# Patient Record
Sex: MALE | Race: WHITE | NOT HISPANIC OR LATINO | Employment: FULL TIME | URBAN - METROPOLITAN AREA
[De-identification: names, ages, dates, MRNs, and addresses within clinical notes are randomized per-mention and may not be internally consistent; named-entity substitution may affect disease eponyms.]

---

## 2017-06-08 ENCOUNTER — OFFICE VISIT (OUTPATIENT)
Dept: INTERNAL MEDICINE | Facility: CLINIC | Age: 22
End: 2017-06-08
Payer: COMMERCIAL

## 2017-06-08 VITALS
HEIGHT: 67 IN | HEART RATE: 84 BPM | DIASTOLIC BLOOD PRESSURE: 70 MMHG | SYSTOLIC BLOOD PRESSURE: 128 MMHG | TEMPERATURE: 98 F | WEIGHT: 211.63 LBS | RESPIRATION RATE: 16 BRPM | BODY MASS INDEX: 33.21 KG/M2

## 2017-06-08 DIAGNOSIS — M25.512 ACUTE PAIN OF LEFT SHOULDER: ICD-10-CM
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
## 2017-06-08 DIAGNOSIS — M54.2 MUSCULOSKELETAL NECK PAIN: ICD-10-CM

## 2017-06-08 PROCEDURE — 99213 OFFICE O/P EST LOW 20 MIN: CPT | Mod: S$GLB,,, | Performed by: INTERNAL MEDICINE

## 2017-06-08 PROCEDURE — 99999 PR PBB SHADOW E&M-EST. PATIENT-LVL IV: CPT | Mod: PBBFAC,,, | Performed by: INTERNAL MEDICINE

## 2017-06-08 RX ORDER — TRAMADOL HYDROCHLORIDE 50 MG/1
50 TABLET ORAL EVERY 6 HOURS PRN
Qty: 30 TABLET | Refills: 0 | Status: SHIPPED | OUTPATIENT
Start: 2017-06-08 | End: 2017-06-08 | Stop reason: SDUPTHER

## 2017-06-08 RX ORDER — CYCLOBENZAPRINE HCL 10 MG
10 TABLET ORAL 3 TIMES DAILY PRN
Qty: 30 TABLET | Refills: 0 | Status: SHIPPED | OUTPATIENT
Start: 2017-06-08 | End: 2017-06-18

## 2017-06-08 RX ORDER — METHOCARBAMOL 500 MG/1
1 TABLET, FILM COATED ORAL 3 TIMES DAILY PRN
Refills: 0 | COMMUNITY
Start: 2017-06-01 | End: 2017-06-08 | Stop reason: ALTCHOICE

## 2017-06-08 RX ORDER — LORATADINE 10 MG/1
10 TABLET ORAL DAILY
COMMUNITY

## 2017-06-08 RX ORDER — TRAMADOL HYDROCHLORIDE 50 MG/1
50 TABLET ORAL EVERY 8 HOURS PRN
Qty: 21 TABLET | Refills: 0 | Status: SHIPPED | OUTPATIENT
Start: 2017-06-08 | End: 2017-06-15
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
## 2017-08-10 ENCOUNTER — HOSPITAL ENCOUNTER (OUTPATIENT)
Dept: RADIOLOGY | Facility: HOSPITAL | Age: 22
Discharge: HOME | End: 2017-08-10
Attending: INTERNAL MEDICINE
Payer: COMMERCIAL

## 2017-08-10 ENCOUNTER — OFFICE VISIT (OUTPATIENT)
Dept: INTERNAL MEDICINE | Facility: CLINIC | Age: 22
End: 2017-08-10
Payer: COMMERCIAL

## 2017-08-10 VITALS
WEIGHT: 214.94 LBS | DIASTOLIC BLOOD PRESSURE: 82 MMHG | BODY MASS INDEX: 33.74 KG/M2 | TEMPERATURE: 99 F | HEIGHT: 67 IN | HEART RATE: 75 BPM | SYSTOLIC BLOOD PRESSURE: 123 MMHG

## 2017-08-10 DIAGNOSIS — G89.29 WRIST PAIN, CHRONIC, RIGHT: Primary | ICD-10-CM

## 2017-08-10 DIAGNOSIS — M25.512 CHRONIC LEFT SHOULDER PAIN: ICD-10-CM

## 2017-08-10 DIAGNOSIS — G89.29 WRIST PAIN, CHRONIC, RIGHT: ICD-10-CM

## 2017-08-10 DIAGNOSIS — M25.531 WRIST PAIN, CHRONIC, RIGHT: Primary | ICD-10-CM

## 2017-08-10 DIAGNOSIS — G89.29 CHRONIC LEFT SHOULDER PAIN: ICD-10-CM

## 2017-08-10 DIAGNOSIS — M25.531 WRIST PAIN, CHRONIC, RIGHT: ICD-10-CM

## 2017-08-10 PROCEDURE — 73110 X-RAY EXAM OF WRIST: CPT | Mod: 26,RT,, | Performed by: RADIOLOGY

## 2017-08-10 PROCEDURE — 3008F BODY MASS INDEX DOCD: CPT | Mod: S$GLB,,, | Performed by: INTERNAL MEDICINE

## 2017-08-10 PROCEDURE — 73110 X-RAY EXAM OF WRIST: CPT | Mod: TC,PO,RT

## 2017-08-10 PROCEDURE — 99213 OFFICE O/P EST LOW 20 MIN: CPT | Mod: S$GLB,,, | Performed by: INTERNAL MEDICINE

## 2017-08-10 PROCEDURE — 99999 PR PBB SHADOW E&M-EST. PATIENT-LVL IV: CPT | Mod: PBBFAC,,, | Performed by: INTERNAL MEDICINE

## 2017-08-10 RX ORDER — TRAMADOL HYDROCHLORIDE 50 MG/1
50 TABLET ORAL EVERY 8 HOURS PRN
Qty: 21 TABLET | Refills: 0 | Status: SHIPPED | OUTPATIENT
Start: 2017-08-10 | End: 2017-08-20
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
## 2019-02-17 ENCOUNTER — OFFICE VISIT (OUTPATIENT)
Dept: URGENT CARE | Facility: CLINIC | Age: 24
End: 2019-02-17
Payer: COMMERCIAL

## 2019-02-17 VITALS
BODY MASS INDEX: 33.59 KG/M2 | TEMPERATURE: 99 F | RESPIRATION RATE: 18 BRPM | HEIGHT: 67 IN | HEART RATE: 70 BPM | WEIGHT: 214 LBS | OXYGEN SATURATION: 98 % | DIASTOLIC BLOOD PRESSURE: 80 MMHG | SYSTOLIC BLOOD PRESSURE: 128 MMHG

## 2019-02-17 DIAGNOSIS — T78.40XA ALLERGIC REACTION, INITIAL ENCOUNTER: Primary | ICD-10-CM

## 2019-02-17 PROCEDURE — 3008F BODY MASS INDEX DOCD: CPT | Mod: CPTII,S$GLB,, | Performed by: FAMILY MEDICINE

## 2019-02-17 PROCEDURE — 96372 PR INJECTION,THERAP/PROPH/DIAG2ST, IM OR SUBCUT: ICD-10-PCS | Mod: S$GLB,,, | Performed by: FAMILY MEDICINE

## 2019-02-17 PROCEDURE — 99213 PR OFFICE/OUTPT VISIT, EST, LEVL III, 20-29 MIN: ICD-10-PCS | Mod: 25,S$GLB,, | Performed by: FAMILY MEDICINE

## 2019-02-17 PROCEDURE — 99213 OFFICE O/P EST LOW 20 MIN: CPT | Mod: 25,S$GLB,, | Performed by: FAMILY MEDICINE

## 2019-02-17 PROCEDURE — 3008F PR BODY MASS INDEX (BMI) DOCUMENTED: ICD-10-PCS | Mod: CPTII,S$GLB,, | Performed by: FAMILY MEDICINE

## 2019-02-17 PROCEDURE — 96372 THER/PROPH/DIAG INJ SC/IM: CPT | Mod: S$GLB,,, | Performed by: FAMILY MEDICINE

## 2019-02-17 RX ORDER — DIPHENHYDRAMINE HCL 25 MG
25 TABLET ORAL
Status: COMPLETED | OUTPATIENT
Start: 2019-02-17 | End: 2019-02-17

## 2019-02-17 RX ORDER — BETAMETHASONE SODIUM PHOSPHATE AND BETAMETHASONE ACETATE 3; 3 MG/ML; MG/ML
9 INJECTION, SUSPENSION INTRA-ARTICULAR; INTRALESIONAL; INTRAMUSCULAR; SOFT TISSUE
Status: COMPLETED | OUTPATIENT
Start: 2019-02-17 | End: 2019-02-17

## 2019-02-17 RX ADMIN — BETAMETHASONE SODIUM PHOSPHATE AND BETAMETHASONE ACETATE 9 MG: 3; 3 INJECTION, SUSPENSION INTRA-ARTICULAR; INTRALESIONAL; INTRAMUSCULAR; SOFT TISSUE at 06:02

## 2019-02-17 RX ADMIN — Medication 25 MG: at 06:02

## 2019-02-17 NOTE — PROGRESS NOTES
"Subjective:       Patient ID: Tiffany Tamez is a 23 y.o. male.    Vitals:  height is 5' 7" (1.702 m) and weight is 97.1 kg (214 lb). His temperature is 98.6 °F (37 °C). His blood pressure is 128/80 and his pulse is 70. His respiration is 18 and oxygen saturation is 98%.     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 Allergic to mold , exposed last night, feels slight tongue swelling and facial puffyness, denies SOB or itching, last exposure more then 3 years ago.did not take benadryl      Allergic Reaction   This is a new problem. The current episode started today. The problem has been gradually worsening since onset. Associated with: Mold. The exposure occurred at home. Pertinent negatives include no chest pain, coughing, diarrhea, rash or vomiting. Treatments tried: Alaclear. The treatment provided no relief. There is no history of seasonal allergies. Swelling is present on the face (Neck).       Constitution: Negative for chills, fatigue and fever.   HENT: Negative for congestion and sore throat.    Neck: Negative for painful lymph nodes.   Cardiovascular: Negative for chest pain and leg swelling.   Eyes: Negative for double vision and blurred vision.   Respiratory: Negative for cough and shortness of breath.    Gastrointestinal: Negative for nausea, vomiting and diarrhea.   Genitourinary: Negative for dysuria, frequency and urgency.   Musculoskeletal: Negative for joint pain, joint swelling, muscle cramps and muscle ache.   Skin: Negative for color change, pale and rash.   Allergic/Immunologic: Negative for seasonal allergies.   Neurological: Negative for dizziness, history of vertigo, light-headedness, passing out and headaches.   Hematologic/Lymphatic: Negative for swollen lymph nodes, easy bruising/bleeding and history of blood clots. Does not bruise/bleed easily.   Psychiatric/Behavioral: Negative for nervous/anxious, sleep " disturbance and depression. The patient is not nervous/anxious.        Objective:      Physical Exam   Constitutional: He is oriented to person, place, and time. He appears well-developed and well-nourished. He is cooperative.  Non-toxic appearance. He does not appear ill. No distress.   HENT:   Head: Normocephalic and atraumatic.   Right Ear: Hearing, tympanic membrane, external ear and ear canal normal.   Left Ear: Hearing, tympanic membrane, external ear and ear canal normal.   Nose: Nose normal. No mucosal edema, rhinorrhea or nasal deformity. No epistaxis. Right sinus exhibits no maxillary sinus tenderness and no frontal sinus tenderness. Left sinus exhibits no maxillary sinus tenderness and no frontal sinus tenderness.   Mouth/Throat: Uvula is midline, oropharynx is clear and moist and mucous membranes are normal. No trismus in the jaw. Normal dentition. No uvula swelling. No oropharyngeal exudate or posterior oropharyngeal erythema.   Eyes: Conjunctivae and lids are normal. Right eye exhibits no discharge. Left eye exhibits no discharge. No scleral icterus.   Sclera clear bilat   Neck: Trachea normal, normal range of motion, full passive range of motion without pain and phonation normal. Neck supple. No JVD present. No tracheal deviation present.   Cardiovascular: Normal rate, regular rhythm, normal heart sounds, intact distal pulses and normal pulses. Exam reveals no gallop and no friction rub.   No murmur heard.  Pulmonary/Chest: Effort normal and breath sounds normal. No stridor. No respiratory distress. He has no wheezes. He has no rales. He exhibits no tenderness.   Abdominal: Soft. Normal appearance and bowel sounds are normal. He exhibits no distension, no pulsatile midline mass and no mass. There is no tenderness.   Musculoskeletal: Normal range of motion. He exhibits no edema or deformity.   Lymphadenopathy:     He has no cervical adenopathy.   Neurological: He is alert and oriented to person, place,  and time. He exhibits normal muscle tone. Coordination normal.   Skin: Skin is warm, dry and intact. He is not diaphoretic. No pallor.   Erythema and puffyness of face    Psychiatric: He has a normal mood and affect. His speech is normal and behavior is normal. Judgment and thought content normal. Cognition and memory are normal.   Nursing note and vitals reviewed.      Assessment:       1. Allergic reaction, initial encounter        Plan:         Allergic reaction, initial encounter    Other orders  -     betamethasone acetate-betamethasone sodium phosphate injection 9 mg     Pt advised  To take benadryl alternate with claritin   FU with Allergist

## 2019-02-18 NOTE — PATIENT INSTRUCTIONS
Angioedema  Angioedema ([id number]-SAMI-[id number]) is a sudden appearance of swollen patches (edema) on the skin or mucous membranes. It most often involves the face, lips, mouth, tongue, back of throat, or vocal cords. It may also occur in other places, such as the arms or legs. A rash may also appear during the first 4 days of this illness.  There are different types of angioedema. Your symptoms will depend on what type of angioedema you have. Swelling and redness may be the main symptoms. Like allergic reactions, angioedema may include:  · Rash, hives, redness, welts, blisters  · Itching, burning, stinging, pain  · Dry, flaky, cracking, or scaly skin  · Swelling of the face, lips, tongue, or other parts of the body  More severe symptoms may include:  · Trouble swallowing, or feeling like your throat is closing  · Trouble breathing or wheezing  · Hoarse voice or trouble speaking  · Nausea, vomiting, diarrhea, or stomach cramps  · Feeling faint or lightheaded, rapid heart rate, or low blood pressure  Angioedema can be triggered by exposure to certain substances. Medical conditions involving the immune systems and certain infections may cause it. In rare cases, angioedema can be hereditary. Sometimes the cause may be very clear. However, it is often hard to find a cause. The most common causes include:  · Foods, such as shrimp, shellfish, peanuts, milk products, gluten, and eggs; also colorings, flavorings, and additives  · Insect bites or stings, from bees, mosquitos, fleas, or ticks  · Medicines, such as ACE inhibitors, penicillin, sulfa drugs, amoxicillin, aspirin, and ibuprofen  · Latex, which may be in gloves, clothes, toys, balloons, and some kinds of tape. People who are allergic to latex may have problems with foods such as bananas, avocados, kiwi, papaya, or chestnuts.  · Stress  · Heat, cold, or sunlight  The most common cause of angioedema is a reaction to a class of medicines called ACE inhibitors. These  are used to treat high blood pressure. ACE inhibitors include captopril, enalapril, and lisinopril. Angiodema can happen even after you have been taking the medicine for some time. Tell your doctor if you have angioedema symptoms and are taking any of these medicines. Angioedema may recur. It is important to watch for the earliest signs of this condition (see the list below). Contact your healthcare provider right away if swelling involves the face, mouth, or throat.  Home care  Rest quietly today. Avoid vigorous physical activity.  Medicines: The healthcare provider may prescribe medicines for itching, swelling, or pain. Follow the healthcare providers instructions when taking these medicines.  · Oral diphenhydramine is an antihistamine available without a prescription. Unless a prescription antihistamine was given, diphenhydramine may be used to reduce widespread itching. It may make you sleepy, so be careful using it when going to school, working, or driving. (Note: Do not use diphenhydramine if you have glaucoma or if you are a man who has trouble urinating due to an enlarged prostate.) Loratadine is an antihistamine that may cause less drowsiness.  · Do not use diphenhydramine cream on your skin. Some people can have an allergic reaction to this.  · Calamine lotion or oatmeal baths sometimes help with itching.  · You may use acetaminophen or ibuprofen for pain, unless another pain medicine was prescribed.  · If you were told that your angioedema was caused by a medicine you are taking, you must stop taking it. Ask your healthcare provider for a different one. In the future, advise medical staff that you are allergic to this medicine.  · If medicine was prescribed, such as steroids or antihistamines, be sure you understand what the medicine is and how to take it.   General care  · Make sure you do not scratch areas of the body that had a reaction. This will help prevent infection.   · Stay away from air  pollution, tobacco, and wood smoke. Also stay away from cold temperatures. These things can make allergy symptoms worse.  · Try to find out what cause your reaction. Make sure to remove the allergen. Future reactions may be worse.   · If you have a serious allergy, wear a medical alert bracelet that notes this allergy.  · If the healthcare provider prescribed an epinephrine auto injector kit, keep it with you at all times.   · Tell all care providers about your allergy. Ask them h ow to use any prescribed medicines.  · Keep a record of allergies and symptoms, and when they occurred. This will help your provider treat you over time.   Follow-up care  Follow up with your healthcare provider, or as advised. You may need to see an allergist. An allergist can help find the cause of an allergic reaction and give recommendations on how to prevent future reactions.  Call 911  Contact emergency services right away if any of these occur:  · Trouble breathing or swallowing, or wheezing  · Hoarse voice or trouble speaking, or drooling  · Chest pain or tightness  · Confusion, lightheadedness, or dizziness  · Extreme drowsiness or trouble awakening  · Fainting or loss of consciousness  · Rapid heart rate  · Vomiting blood, or large amounts of blood in stool  · Seizure  · Nausea, vomiting, diarrhea, abdominal pain, or stomach cramps  When to seek medical attention  Call your healthcare provider right away if any of the following occur:  · Symptoms don't go away  · Symptoms come back  · Symptoms get worse or new symptoms develop  · Hives feel uncomfortable  · Fever of 100.4°F (38°C), or as directed by your healthcare professional  Date Last Reviewed: 5/1/2017  © 5211-7322 [employer]. [address] [place] [address], [place] [address]. All rights reserved. This information is not intended as a substitute for professional medical care. Always follow your healthcare professional's instructions.

## 2019-02-19 ENCOUNTER — OFFICE VISIT (OUTPATIENT)
Dept: INTERNAL MEDICINE | Facility: CLINIC | Age: 24
End: 2019-02-19
Payer: COMMERCIAL

## 2019-02-19 VITALS
HEIGHT: 67 IN | HEART RATE: 68 BPM | TEMPERATURE: 98 F | WEIGHT: 226.44 LBS | RESPIRATION RATE: 16 BRPM | DIASTOLIC BLOOD PRESSURE: 78 MMHG | BODY MASS INDEX: 35.54 KG/M2 | SYSTOLIC BLOOD PRESSURE: 124 MMHG | OXYGEN SATURATION: 97 %

## 2019-02-19 DIAGNOSIS — T65.91XD ALLERGIC REACTION TO CHEMICAL SUBSTANCE, ACCIDENTAL OR UNINTENTIONAL, SUBSEQUENT ENCOUNTER: Primary | ICD-10-CM

## 2019-02-19 DIAGNOSIS — H10.213: ICD-10-CM

## 2019-02-19 DIAGNOSIS — Z77.120 MOLD EXPOSURE: ICD-10-CM

## 2019-02-19 DIAGNOSIS — R21 GENERALIZED RASH: ICD-10-CM

## 2019-02-19 PROCEDURE — 99999 PR PBB SHADOW E&M-EST. PATIENT-LVL V: ICD-10-PCS | Mod: PBBFAC,,, | Performed by: INTERNAL MEDICINE

## 2019-02-19 PROCEDURE — 3008F BODY MASS INDEX DOCD: CPT | Mod: CPTII,S$GLB,, | Performed by: INTERNAL MEDICINE

## 2019-02-19 PROCEDURE — 99214 PR OFFICE/OUTPT VISIT, EST, LEVL IV, 30-39 MIN: ICD-10-PCS | Mod: 25,S$GLB,, | Performed by: INTERNAL MEDICINE

## 2019-02-19 PROCEDURE — 99214 OFFICE O/P EST MOD 30 MIN: CPT | Mod: 25,S$GLB,, | Performed by: INTERNAL MEDICINE

## 2019-02-19 PROCEDURE — 96372 PR INJECTION,THERAP/PROPH/DIAG2ST, IM OR SUBCUT: ICD-10-PCS | Mod: S$GLB,,, | Performed by: INTERNAL MEDICINE

## 2019-02-19 PROCEDURE — 3008F PR BODY MASS INDEX (BMI) DOCUMENTED: ICD-10-PCS | Mod: CPTII,S$GLB,, | Performed by: INTERNAL MEDICINE

## 2019-02-19 PROCEDURE — 99999 PR PBB SHADOW E&M-EST. PATIENT-LVL V: CPT | Mod: PBBFAC,,, | Performed by: INTERNAL MEDICINE

## 2019-02-19 PROCEDURE — 96372 THER/PROPH/DIAG INJ SC/IM: CPT | Mod: S$GLB,,, | Performed by: INTERNAL MEDICINE

## 2019-02-19 RX ORDER — HYDROXYZINE HYDROCHLORIDE 50 MG/1
50 TABLET, FILM COATED ORAL 3 TIMES DAILY PRN
Qty: 30 TABLET | Refills: 0 | Status: SHIPPED | OUTPATIENT
Start: 2019-02-19 | End: 2019-03-14

## 2019-02-19 RX ORDER — PREDNISONE 20 MG/1
40 TABLET ORAL DAILY PRN
Qty: 12 TABLET | Refills: 0 | Status: SHIPPED | OUTPATIENT
Start: 2019-02-19 | End: 2019-03-01

## 2019-02-19 RX ORDER — OLOPATADINE HYDROCHLORIDE 1 MG/ML
1 SOLUTION/ DROPS OPHTHALMIC 2 TIMES DAILY
Qty: 5 ML | Refills: 0 | Status: SHIPPED | OUTPATIENT
Start: 2019-02-19 | End: 2019-04-02

## 2019-02-19 RX ORDER — TRIAMCINOLONE ACETONIDE 40 MG/ML
40 INJECTION, SUSPENSION INTRA-ARTICULAR; INTRAMUSCULAR
Status: COMPLETED | OUTPATIENT
Start: 2019-02-19 | End: 2019-02-19

## 2019-02-19 RX ADMIN — TRIAMCINOLONE ACETONIDE 40 MG: 40 INJECTION, SUSPENSION INTRA-ARTICULAR; INTRAMUSCULAR at 10:02

## 2019-02-19 NOTE — PATIENT INSTRUCTIONS
Saline nasal spray every 2 hours as needed    Vaseline on nostrils and face    Avoid all scented detergents and bath products    Use ivory soap or unscented soap    No lotions     Benadryl cream on arms and groin only    Pepcid 10mg twice daily- over the counter

## 2019-02-19 NOTE — PROGRESS NOTES
Subjective:       Patient ID: Tiffany Tamez is a 23 y.o. male who presents for Rash (allergic reaction to mold- per pt; urgent care follow up ) and Conjunctivitis      Rash   This is a new problem. The current episode started in the past 7 days. The problem is unchanged. The rash is diffuse. The rash is characterized by redness, swelling and itchiness. He was exposed to chemicals. Associated symptoms include eye pain and facial edema. Pertinent negatives include no congestion, cough, diarrhea, fever, rhinorrhea, shortness of breath or vomiting. Past treatments include oral steroids and anti-itch cream. The treatment provided no relief. His past medical history is significant for allergies. There is no history of asthma.   Conjunctivitis   This is a new problem. The current episode started in the past 7 days. The problem occurs constantly. The problem has been unchanged. Associated symptoms include a rash. Pertinent negatives include no abdominal pain, arthralgias, chest pain, chills, congestion, coughing, diaphoresis, fever, headaches, joint swelling, myalgias, nausea, swollen glands, vomiting or weakness. Nothing aggravates the symptoms. He has tried nothing for the symptoms.   Allergic Reaction   This is a new problem. The current episode started 2 days ago. The problem is unchanged. The problem is severe. Associated with: chemicals for mold removal. The exposure occurred at work. Associated symptoms include eye itching, eye redness and a rash. Pertinent negatives include no abdominal pain, chest pain, coughing, diarrhea, difficulty breathing, hyperventilation, skin blistering, stridor, trouble swallowing or vomiting. His rash is characterized by: diffuse and raised (no mucosal involvement).Past treatments include oral corticosteroid and diphenhydramine. The treatment provided no relief. There is no history of asthma or food allergies. Swelling is present on the eyes.      Urgent care for allergic reaction  on 2/17/19, treated with steroids      Review of Systems   Constitutional: Negative for chills, diaphoresis and fever.   HENT: Negative for congestion, ear pain, postnasal drip, rhinorrhea and trouble swallowing.    Eyes: Positive for pain, redness and itching.   Respiratory: Negative for cough, chest tightness, shortness of breath and stridor.    Cardiovascular: Negative for chest pain and palpitations.   Gastrointestinal: Negative for abdominal pain, constipation, diarrhea, nausea and vomiting.   Musculoskeletal: Negative for arthralgias, joint swelling and myalgias.   Skin: Positive for color change and rash. Negative for wound.   Allergic/Immunologic: Positive for environmental allergies. Negative for food allergies.   Neurological: Negative for dizziness, weakness and headaches.       Objective:      Physical Exam   Constitutional: He is oriented to person, place, and time. He appears well-developed and well-nourished.   HENT:   Head: Normocephalic and atraumatic.   Right Ear: Hearing and external ear normal.   Left Ear: Hearing and external ear normal.   Nose: Nose normal. No mucosal edema. No epistaxis.   Mouth/Throat: Uvula is midline, oropharynx is clear and moist and mucous membranes are normal.   Eyes: Lids are normal. Right conjunctiva is injected. Left conjunctiva is injected.   Neck: Full passive range of motion without pain.   Cardiovascular: Normal rate and regular rhythm.   No murmur heard.  Pulmonary/Chest: Effort normal and breath sounds normal. No tachypnea and no bradypnea. He has no wheezes.   Abdominal: Soft. Bowel sounds are normal. He exhibits no distension. There is no tenderness.   Musculoskeletal: Normal range of motion.   Neurological: He is alert and oriented to person, place, and time.   Skin: Skin is warm and dry. Rash noted. Rash is maculopapular (diffuse).   Psychiatric: He has a normal mood and affect.       Assessment/Plan:       1. Allergic reaction to chemical substance,  accidental or unintentional, subsequent encounter  - triamcinolone acetonide injection 40 mg  - predniSONE (DELTASONE) 20 MG tablet; Take 2 tablets (40 mg total) by mouth daily as needed (40mg daily x 3 days then 20mg daily x 3 days then 10mg daily x 3 days then stop).  Dispense: 12 tablet; Refill: 0  - hydrOXYzine (ATARAX) 50 MG tablet; Take 1 tablet (50 mg total) by mouth 3 (three) times daily as needed for Itching (instead of benadryl).  Dispense: 30 tablet; Refill: 0  - Ambulatory consult to Allergy    2. Mold exposure  - Ambulatory consult to Allergy    3. Conjunctivitis, chemical, bilateral  - olopatadine (PATANOL) 0.1 % ophthalmic solution; Place 1 drop into both eyes 2 (two) times daily.  Dispense: 5 mL; Refill: 0    4. Generalized rash  - triamcinolone acetonide injection 40 mg  - predniSONE (DELTASONE) 20 MG tablet; Take 2 tablets (40 mg total) by mouth daily as needed (40mg daily x 3 days then 20mg daily x 3 days then 10mg daily x 3 days then stop).  Dispense: 12 tablet; Refill: 0  - hydrOXYzine (ATARAX) 50 MG tablet; Take 1 tablet (50 mg total) by mouth 3 (three) times daily as needed for Itching (instead of benadryl).  Dispense: 30 tablet; Refill: 0    Call if no improvement in symptoms    Karina Diana MD

## 2019-02-28 ENCOUNTER — OFFICE VISIT (OUTPATIENT)
Dept: ALLERGY | Facility: CLINIC | Age: 24
End: 2019-02-28
Payer: COMMERCIAL

## 2019-02-28 VITALS — BODY MASS INDEX: 36.36 KG/M2 | HEIGHT: 67 IN | WEIGHT: 231.69 LBS

## 2019-02-28 DIAGNOSIS — H10.13 ALLERGIC CONJUNCTIVITIS, BILATERAL: ICD-10-CM

## 2019-02-28 DIAGNOSIS — L30.9 DERMATITIS: Primary | ICD-10-CM

## 2019-02-28 DIAGNOSIS — T78.3XXA ANGIOEDEMA, INITIAL ENCOUNTER: ICD-10-CM

## 2019-02-28 DIAGNOSIS — J30.9 CHRONIC ALLERGIC RHINITIS: ICD-10-CM

## 2019-02-28 PROCEDURE — 99244 PR OFFICE CONSULTATION,LEVEL IV: ICD-10-PCS | Mod: S$GLB,,, | Performed by: ALLERGY & IMMUNOLOGY

## 2019-02-28 PROCEDURE — 99999 PR PBB SHADOW E&M-EST. PATIENT-LVL II: ICD-10-PCS | Mod: PBBFAC,,, | Performed by: ALLERGY & IMMUNOLOGY

## 2019-02-28 PROCEDURE — 99999 PR PBB SHADOW E&M-EST. PATIENT-LVL II: CPT | Mod: PBBFAC,,, | Performed by: ALLERGY & IMMUNOLOGY

## 2019-02-28 PROCEDURE — 99244 OFF/OP CNSLTJ NEW/EST MOD 40: CPT | Mod: S$GLB,,, | Performed by: ALLERGY & IMMUNOLOGY

## 2019-02-28 NOTE — LETTER
February 28, 2019      Karina Diana MD  [address]  [place] [address]           [place] [facility]  [address] [facility]  [place] [address]  Phone: [phone]          Patient: Tiffany Tamez   MR Number: [id number]   YOB: 1995   Date of Visit: 2/28/2019       Dear Dr. Karina Diana:    Thank you for referring Tiffany Tamez to me for evaluation. Attached you will find relevant portions of my assessment and plan of care.    If you have questions, please do not hesitate to call me. I look forward to following Tiffany Tamez along with you.    Sincerely,    Cheryl Martinez MD    Enclosure  CC:  No Recipients    If you would like to receive this communication electronically, please contact [email or web address] or [phone] to request more information on [employer] Link access.    For providers and/or their staff who would like to refer a patient to [facility], please contact us through our one-stop-shop provider referral line, [facility], at [phone].    If you feel you have received this communication in error or would no longer like to receive these types of communications, please e-mail [email or web address]

## 2019-02-28 NOTE — PROGRESS NOTES
Subjective:       Patient ID: Tiffany Tamez is a 23 y.o. male.    Chief Complaint:  Allergic Reaction (mold exposer)      24 yo man presents for consult from Dr Ria Diana for allergic reaction. He states he has allergic rhinitis and conjunctivitis. About 3 years ago had allergy test positive to most trees and grass. He is worse in spring and summer and takes Claritin BID those seasons and daily in fall, will hold in winter. Normally this is for sneeze, runny nose, itchy red watery eyes and loratadine works pretty well. He was off of it loratadine 10 days ago when he was cleaning a home with green mold. He used mold control . Shortly after his eyes were red, watery and puffy. Face and neck were red with rash and swollen. He had slight tightness inc chest but could breathe. this was on Saturday. Sunday not better so went to  and given steroid shot. Monday not better so Tuesday saw PCP and given another steroid shot, atarax and loratadine. This helped. Rash is better but still with some areas peeling. He wants to know what more to do. He has no known food allergy except tomatoes cause GO upset. No insect or latex allergy. Had exercise induced asthma as child but no issues in 10 years. No other medical issues. No surgeries.         Environmental History: see history section for home environment  Review of Systems   Constitutional: Negative for activity change, appetite change, chills, fatigue, fever and unexpected weight change.   HENT: Positive for facial swelling, rhinorrhea and sneezing. Negative for congestion, ear discharge, ear pain, hearing loss, mouth sores, nosebleeds, postnasal drip, sinus pressure, sore throat, tinnitus, trouble swallowing and voice change.    Eyes: Positive for discharge, redness and itching. Negative for visual disturbance.   Respiratory: Positive for chest tightness. Negative for cough, shortness of breath and wheezing.    Cardiovascular: Negative for chest pain,  palpitations and leg swelling.   Gastrointestinal: Negative for abdominal distention, abdominal pain, constipation, diarrhea, nausea and vomiting.   Genitourinary: Negative for difficulty urinating.   Musculoskeletal: Negative for arthralgias, back pain, joint swelling and myalgias.   Skin: Positive for color change and rash. Negative for pallor.   Neurological: Negative for dizziness, tremors, speech difficulty, weakness, light-headedness and headaches.   Hematological: Negative for adenopathy. Does not bruise/bleed easily.   Psychiatric/Behavioral: Negative for agitation, confusion, decreased concentration and sleep disturbance. The patient is not nervous/anxious.         Objective:      Physical Exam   Constitutional: He is oriented to person, place, and time. He appears well-developed and well-nourished. No distress.   HENT:   Head: Normocephalic and atraumatic.   Right Ear: Hearing, tympanic membrane, external ear and ear canal normal.   Left Ear: Hearing, tympanic membrane, external ear and ear canal normal.   Nose: No mucosal edema (pink turbinates), rhinorrhea, sinus tenderness or septal deviation. No epistaxis.   Mouth/Throat: Oropharynx is clear and moist and mucous membranes are normal. No uvula swelling.   Eyes: Conjunctivae are normal. Right eye exhibits no discharge. Left eye exhibits no discharge.   Neck: Normal range of motion. No thyromegaly present.   Cardiovascular: Normal rate, regular rhythm and normal heart sounds.   No murmur heard.  Pulmonary/Chest: Effort normal and breath sounds normal. No respiratory distress. He has no wheezes.   Abdominal: Soft. He exhibits no distension. There is no tenderness.   Musculoskeletal: Normal range of motion. He exhibits no edema.   Lymphadenopathy:     He has no cervical adenopathy.   Neurological: He is alert and oriented to person, place, and time. Coordination normal.   Skin: Skin is warm and dry. No rash noted. No erythema.   Psychiatric: He has a normal  mood and affect. His behavior is normal. Judgment and thought content normal.   Nursing note and vitals reviewed.      Laboratory:   none performed   Assessment:       1. Dermatitis    2. Angioedema, initial encounter    3. Chronic allergic rhinitis    4. Allergic conjunctivitis, bilateral         Plan:       1. Advised pt reaction was more likely from the chemical given the rash he had and length of symptoms. advised to avid that chemical and in future wear mask and cover skin when spraying anything like that. Given his H/o pollen allergy is possible also had mold allergy. histamine control and was negative due to loratadine use so he will consider and reschedule in future to test  2. For rhinitis can continue loratadine daily to BID or trial levocetirizine and add fluticasone 2 SEN daily  3. Dr Diana notified of completed consult via [employer]

## 2019-03-14 ENCOUNTER — OFFICE VISIT (OUTPATIENT)
Dept: INTERNAL MEDICINE | Facility: CLINIC | Age: 24
End: 2019-03-14
Payer: COMMERCIAL

## 2019-03-14 ENCOUNTER — HOSPITAL ENCOUNTER (OUTPATIENT)
Dept: RADIOLOGY | Facility: HOSPITAL | Age: 24
Discharge: HOME | End: 2019-03-14
Attending: INTERNAL MEDICINE
Payer: COMMERCIAL

## 2019-03-14 VITALS
SYSTOLIC BLOOD PRESSURE: 96 MMHG | TEMPERATURE: 99 F | HEIGHT: 67 IN | DIASTOLIC BLOOD PRESSURE: 68 MMHG | WEIGHT: 234.56 LBS | HEART RATE: 74 BPM | BODY MASS INDEX: 36.81 KG/M2

## 2019-03-14 DIAGNOSIS — M25.531 ACUTE PAIN OF RIGHT WRIST: ICD-10-CM

## 2019-03-14 DIAGNOSIS — S69.91XA INJURY, WRIST, RIGHT, INITIAL ENCOUNTER: ICD-10-CM

## 2019-03-14 DIAGNOSIS — M25.531 ACUTE PAIN OF RIGHT WRIST: Primary | ICD-10-CM

## 2019-03-14 PROCEDURE — 3008F PR BODY MASS INDEX (BMI) DOCUMENTED: ICD-10-PCS | Mod: CPTII,S$GLB,, | Performed by: INTERNAL MEDICINE

## 2019-03-14 PROCEDURE — 99213 PR OFFICE/OUTPT VISIT, EST, LEVL III, 20-29 MIN: ICD-10-PCS | Mod: S$GLB,,, | Performed by: INTERNAL MEDICINE

## 2019-03-14 PROCEDURE — 73110 X-RAY EXAM OF WRIST: CPT | Mod: TC,PO,RT

## 2019-03-14 PROCEDURE — 99999 PR PBB SHADOW E&M-EST. PATIENT-LVL III: ICD-10-PCS | Mod: PBBFAC,,, | Performed by: INTERNAL MEDICINE

## 2019-03-14 PROCEDURE — 3008F BODY MASS INDEX DOCD: CPT | Mod: CPTII,S$GLB,, | Performed by: INTERNAL MEDICINE

## 2019-03-14 PROCEDURE — 99213 OFFICE O/P EST LOW 20 MIN: CPT | Mod: S$GLB,,, | Performed by: INTERNAL MEDICINE

## 2019-03-14 PROCEDURE — 73110 XR WRIST COMPLETE 3 VIEWS RIGHT: ICD-10-PCS | Mod: 26,RT,, | Performed by: RADIOLOGY

## 2019-03-14 PROCEDURE — 99999 PR PBB SHADOW E&M-EST. PATIENT-LVL III: CPT | Mod: PBBFAC,,, | Performed by: INTERNAL MEDICINE

## 2019-03-14 PROCEDURE — 73110 X-RAY EXAM OF WRIST: CPT | Mod: 26,RT,, | Performed by: RADIOLOGY

## 2019-03-14 NOTE — PROGRESS NOTES
Subjective:       Patient ID: Tiffany Tamez is a 23 y.o. male who presents for Wrist Injury (right)      Wrist Injury    The incident occurred 5 to 7 days ago. 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 The pain is present in the right wrist. The quality of the pain is described as aching. The pain is at a severity of 8/10. The pain is severe. The pain has been intermittent since the incident. Associated symptoms include muscle weakness (reports weakness when holding some items). Pertinent negatives include no chest pain, numbness or tingling. The symptoms are aggravated by lifting. He has tried nothing for the symptoms.        Review of Systems   Constitutional: Negative for chills, diaphoresis and fever.   HENT: Negative for congestion, rhinorrhea and sinus pressure.    Eyes: Negative for redness and itching.        Eye symptoms are better with Patanol   Respiratory: Negative for cough and shortness of breath.    Cardiovascular: Negative for chest pain and palpitations.   Gastrointestinal: Negative for abdominal pain, diarrhea, nausea and vomiting.   Musculoskeletal: Positive for arthralgias (right wrist pain with certain movements, feels a vibrating sensation when moving his wrist). Negative for myalgias.   Skin: Negative for color change and rash.        Symptoms controlled with continued use of claritin   Allergic/Immunologic: Positive for environmental allergies.   Neurological: Negative for dizziness, tingling, numbness and headaches.       Objective:      Physical Exam   Constitutional: He is oriented to person, place, and time. He appears well-developed and well-nourished.   HENT:   Head: Normocephalic and atraumatic.   Right Ear: Hearing and external ear normal.   Left Ear: Hearing and external ear normal.   Nose: Nose normal.   Mouth/Throat: Uvula is midline.   Eyes: Lids are  normal.   Neck: Full passive range of motion without pain.   Cardiovascular: Normal rate and regular rhythm.   No murmur heard.  Pulmonary/Chest: Effort normal and breath sounds normal. He has no wheezes.   Abdominal: Soft. Bowel sounds are normal. He exhibits no distension. There is no tenderness.   Musculoskeletal: Normal range of motion.        Right wrist: He exhibits tenderness and crepitus. He exhibits normal range of motion and no laceration.   + tenderness along wrist, pos finkelstein   Neurological: He is alert and oriented to person, place, and time. No sensory deficit.   Skin: Skin is warm, dry and intact. No rash noted. Rash is not maculopapular.   Psychiatric: He has a normal mood and affect.       Assessment/Plan:       1. Acute pain of right wrist  - X-Ray Wrist Complete 3 views Right; Future  - possible tenosynovitis  - Ambulatory Referral to Orthopedics    2. Injury, wrist, right, initial encounter  - X-Ray Wrist Complete 3 views Right; Future    Karina Diana MD

## 2019-03-29 DIAGNOSIS — R52 PAIN: Primary | ICD-10-CM

## 2019-04-02 ENCOUNTER — OFFICE VISIT (OUTPATIENT)
Dept: ORTHOPEDICS | Facility: CLINIC | Age: 24
End: 2019-04-02
Payer: COMMERCIAL

## 2019-04-02 VITALS
HEART RATE: 66 BPM | BODY MASS INDEX: 36.81 KG/M2 | HEIGHT: 67 IN | DIASTOLIC BLOOD PRESSURE: 80 MMHG | SYSTOLIC BLOOD PRESSURE: 115 MMHG | WEIGHT: 234.56 LBS

## 2019-04-02 DIAGNOSIS — M77.8 WRIST TENDONITIS: Primary | ICD-10-CM

## 2019-04-02 PROCEDURE — 99203 PR OFFICE/OUTPT VISIT, NEW, LEVL III, 30-44 MIN: ICD-10-PCS | Mod: S$GLB,,, | Performed by: ORTHOPAEDIC SURGERY

## 2019-04-02 PROCEDURE — 3008F PR BODY MASS INDEX (BMI) DOCUMENTED: ICD-10-PCS | Mod: CPTII,S$GLB,, | Performed by: ORTHOPAEDIC SURGERY

## 2019-04-02 PROCEDURE — 3008F BODY MASS INDEX DOCD: CPT | Mod: CPTII,S$GLB,, | Performed by: ORTHOPAEDIC SURGERY

## 2019-04-02 PROCEDURE — 99999 PR PBB SHADOW E&M-EST. PATIENT-LVL III: ICD-10-PCS | Mod: PBBFAC,,, | Performed by: ORTHOPAEDIC SURGERY

## 2019-04-02 PROCEDURE — 99203 OFFICE O/P NEW LOW 30 MIN: CPT | Mod: S$GLB,,, | Performed by: ORTHOPAEDIC SURGERY

## 2019-04-02 PROCEDURE — 99999 PR PBB SHADOW E&M-EST. PATIENT-LVL III: CPT | Mod: PBBFAC,,, | Performed by: ORTHOPAEDIC SURGERY

## 2019-04-02 NOTE — LETTER
April 5, 2019      Karina Diana MD  [address]  [place] [address]           [facility] [address] [facility] [address]  [address], [address]  [facility] [address]  Phone: [phone]          Patient: Tiffany Tamez   MR Number: [id number]   YOB: 1995   Date of Visit: 4/2/2019       Dear Dr. Karina Diana:    Thank you for referring Tiffany Tamez to me for evaluation. Attached you will find relevant portions of my assessment and plan of care.    If you have questions, please do not hesitate to call me. I look forward to following Tiffany Tamez along with you.    Sincerely,    Ramonita Willard MD    Enclosure  CC:  No Recipients    If you would like to receive this communication electronically, please contact [email or web address] or [phone] to request more information on [employer] access.    For providers and/or their staff who would like to refer a patient to [facility], please contact us through our one-stop-shop provider referral line, [facility], at [phone].    If you feel you have received this communication in error or would no longer like to receive these types of communications, please e-mail [email or web address]

## 2019-04-02 NOTE — PROGRESS NOTES
CHIEF COMPLAINT: Right wrist pain.                                             HISTORY OF PRESENT ILLNESS:  The patient is a 23 y.o.  right hand dominant male who presents for evaluation of his right wrist.       History of Trauma: started after using a jackhammer 1.5 weeks ago  Occupation:   Pain Duration: 1.5 weeks  Pain Quality: achy  Pain Context:improving  Pain Timing: intermittent  Pain Location:dorsoradial distal forearm  Pain Severity: moderate  Aggrevating Factors: activities, weight lifting  Previous Treatments: rest  Associated Signs and Symptoms:none    Pain is affecting ADLs.       PAST MEDICAL HISTORY:   Past Medical History:   Diagnosis Date    Allergy     Angio-edema     Asthma     Headache(784.0)     Migraine      PAST SURGICAL HISTORY:   Past Surgical History:   Procedure Laterality Date    WRIST SURGERY       FAMILY HISTORY:   Family History   Problem Relation Age of Onset    No Known Problems Mother     Allergic rhinitis Father     Asthma Father     Asthma Sister     Allergic rhinitis Sister     Allergies Neg Hx     Angioedema Neg Hx     Atopy Neg Hx     Eczema Neg Hx     Immunodeficiency Neg Hx     Rhinitis Neg Hx     Urticaria Neg Hx      SOCIAL HISTORY:   Social History     Socioeconomic History    Marital status: Single     Spouse name: Not on file    Number of children: Not on file    Years of education: Not on file    Highest education level: Not on file   Occupational History    Not on file   Social Needs    Financial resource strain: Not on file    Food insecurity:     Worry: Not on file     Inability: Not on file    Transportation needs:     Medical: Not on file     Non-medical: Not on file   Tobacco Use    Smoking status: Never Smoker    Smokeless tobacco: Never Used   Substance and Sexual Activity    Alcohol use: Yes     Alcohol/week: 0.0 oz     Comment: 5 drinks per week    Drug use: Yes     Comment: marijuana    Sexual  "activity: Yes     Partners: Female     Birth control/protection: None   Lifestyle    Physical activity:     Days per week: Not on file     Minutes per session: Not on file    Stress: Not on file   Relationships    Social connections:     Talks on phone: Not on file     Gets together: Not on file     Attends Judaism service: Not on file     Active member of club or organization: Not on file     Attends meetings of clubs or organizations: Not on file     Relationship status: Not on file    Intimate partner violence:     Fear of current or ex partner: Not on file     Emotionally abused: Not on file     Physically abused: Not on file     Forced sexual activity: Not on file   Other Topics Concern    Not on file   Social History Narrative    Not on file       MEDICATIONS:   Current Outpatient Medications:     loratadine (CLARITIN) 10 mg tablet, Take 10 mg by mouth once daily at 6am., Disp: , Rfl:   ALLERGIES:   Review of patient's allergies indicates:   Allergen Reactions    Nsaids (non-steroidal anti-inflammatory drug) Anaphylaxis and Swelling    Mold Swelling       VITAL SIGNS: /80 (BP Location: Left arm, Patient Position: Sitting, BP Method: Large (Automatic))   Pulse 66   Ht 5' 7" (1.702 m)   Wt 106.4 kg (234 lb 9.1 oz)   BMI 36.74 kg/m²      Review of Systems   Constitution: Negative for chills, fever, weakness and weight loss.   HENT: Negative for congestion.   Cardiovascular: Negative for chest pain and dyspnea on exertion.   Respiratory: Negative for cough and shortness of breath.   Hematologic/Lymphatic: Does not bruise/bleed easily.   Skin: Negative for rash and suspicious lesions.   Musculoskeletal: see HPI  Gastrointestinal: Negative for bowel incontinence, constipation,diarrhea, vomiting.   Genitourinary: Negative for bladder incontinence.   Neurological: Negative for numbness, paresthesias and sensory change.       PHYSICAL EXAMINATION:  General:  The patient is alert and oriented x 3.  " Mood is pleasant.  Observation of ears, eyes and nose reveal no gross abnormalities.  No labored breathing observed.  Gait is coordinated. Patient can toe walk and heel walk without difficulty.       right Hand Exam    OBSERVATION:     Swelling  none  Deformity  none   Discoloration  none   Atrophy  none   Scars   none             Erythema                    none    TENDERNESS:   Radial:   DRUJ    Neg   Radial Styloid   Neg   Radial Shaft                            Neg   1st dorsal Compartment Neg   Alicia's Tubercle  Neg   Basal Joint Thumb  Neg   ECRL Tendon   Neg   FCR Tendon   Neg   Snuff Box   Neg   Lunate    Neg     Ulnar:   ECU Sheath   Neg   FCU Tendon   Neg   Pisiform   Neg   TFCC    Neg   Ulnar Styloid   Neg   Ulnar Shaft   Neg     Hand:   Palmar Fascia  Neg   Metacarpal   Neg   MCP    Neg   Phalanx   Neg   PIP    Neg   DIP    Neg   A1- Pulley   Neg   Flexor Tendons  Neg   Finger Tip   Neg         ROM: (AROM)   Right    Left            Wrist Flexion   80°       80°      Wrist Extension   75°      75°   Radial Deviation  30°     30°    Ulnar Deviation  30°      30°      Pronation   90     90   Supination   90    90    STRENGTH:    RIGHT    LEFT    Wrist Flexion   5/5    5/5    Wrist Extension  5/5    5/5   Hand    5/5    5/5   Opposition   5/5    5/5    SPECIAL TESTS:   Phalens      Neg   Durkan Carpal Compression    Neg   Tinel (wrist)      Neg   Finkelstein      Neg   Piano Carrion (ulnar translation)    Neg   Hernandez Scaphoid Shift    Neg   Caden Test      Normal   Froment Sign      Neg   CMC Grind      Neg   Aydin (Intrinsic Tightness)    Neg         TTP over extensor tendons , no ROm deficiet. + fluid over tendons in 4th compartment           EXTREMITY NEURO-VASCULAR EXAM    Sensation grossly intact to light touch all dermatomal regions.    DTR 2+ Biceps, Triceps, BR and Negative Monets sign   Grossly intact motor function of AIN, PIN, Median, Ulnar , Radial nerves   Distal pulses radial and ulnar  2+, brisk cap refill, symmetric.    Compartments of forearm and hand are soft and compressible     NECK:  Painless FROM and spinous processes non-tender. Negative Spurlings sign.      OTHER FINDINGS:      XRAYS:  Hand series right,  were obtained and reviewed  No convincing fracture or dislocation is noted. The osseous structures appear well mineralized and well aligned    MRI: none    ASSESSMENT:   Tiffany Tamez is a 22 yo male with right wrist extensor tendonitis    PLAN:    I have discussed the nature of this problem with the patient today. We discussed both treatment options.   Bracing, OT  Offered injection, however, patient declined for now  Follow-up PRN

## 2019-04-02 NOTE — PROGRESS NOTES
I have personally taken the history and examined this patient. I agree with the resident's note as stated above.  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 Pt is normally a . Pain has gotten better    Exam: TTP over extensor tendons , no ROm deficiet. + fluid over tendons in 4th compartment  Xrays negative    Plan:OT, bracing

## 2019-04-11 ENCOUNTER — CLINICAL SUPPORT (OUTPATIENT)
Dept: REHABILITATION | Facility: HOSPITAL | Age: 24
End: 2019-04-11
Attending: ORTHOPAEDIC SURGERY
Payer: COMMERCIAL

## 2019-04-11 DIAGNOSIS — M25.431 EFFUSION, RIGHT WRIST: ICD-10-CM

## 2019-04-11 DIAGNOSIS — M25.531 PAIN IN RIGHT WRIST: ICD-10-CM

## 2019-04-11 PROCEDURE — 97110 THERAPEUTIC EXERCISES: CPT | Mod: PO

## 2019-04-11 PROCEDURE — 97165 OT EVAL LOW COMPLEX 30 MIN: CPT | Mod: PO

## 2019-04-11 NOTE — PLAN OF CARE
[facility] Occupational Therapy  Initial Evaluation     Date: 4/11/2019  Name: Tiffany Tamez  Clinic Number: [id number]    Therapy Diagnosis:   Encounter Diagnoses   Name Primary?    Pain in right wrist     Effusion, right wrist      Physician: Ramonita Willard, *    Physician Orders: Eval and treat, ROM, HEP, modalities ok prn, pain control, edema management   2 x weekly for 6 weeks  Medical Diagnosis: M77.8 (ICD-10-CM) - Wrist tendonitis  Surgical Procedure and Date: N/a  Evaluation Date: 4/11/2019  Insurance Authorization Period Expiration: 12/31/19  Plan of Care Certification Period: 5/24/19  Date of Return to MD: not scheduled    Visit # / Visits authorized: 1/ 20   Time In:8:05AM  Time Out: 9:00AM  Total Billable Time: 55 minutes  FOTO #:     Precautions:  Standard    Subjective     Involved Side: RUE  Dominant Side: Right  Date of Onset: Approximately 3.5 weeks ago  History of Current Condition/Mechanism of Injury: Pt states that pain began about a month ago after operating a wenceslao hammer. He states that it hurts when pushing up through his wrist, when his thumb catches, and when he pronates his forearm. He reports MD gave him a pre fabricated soft wrist brace to wear.  Imaging: no  Previous Therapy: no    Past Medical History/Physical Systems Review:   Tiffany Tamez  has a past medical history of Allergy, Angio-edema, Asthma, Headache(784.0), and Migraine.    Tiffany Tamez  has a past surgical history that includes Wrist surgery.    Tiffany has a current medication list which includes the following prescription(s): loratadine.    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 Upon removal, there is mild edema of the dorsal first 1/3 of forearm    Palpation: Tenderness to palpation about 2cm proximal to sergio's tubercle    AROM of hand and wrist is WNL.    /pinch deferred due to pain    Treatment     Treatment Time In: 8:40AM  Treatment Time Out: 8:55AM  Total Treatment time separate from Evaluation time:15 mins      Tiffany received therapeutic exercises for 15 minutes including:  -Initial HEP instruction and demonstration    Home Exercise Program/Education:  Issued HEP (see patient instructions in EMR) and educated on modality use for pain management . Exercises were reviewed and Tiffany was able to demonstrate them prior to the end of the session.   Pt received a written copy of exercises to perform at home. Tiffany demonstrated good  understanding of the education provided.  Pt was advised to perform these exercises free of pain, and to stop performing them if pain occurs.    Patient/Family Education: role of OT, goals for OT, scheduling/cancellations - pt verbalized understanding. Discussed insurance limitations with patient.    Additional Education provided: Recommended patient use soft thumb spica brace vs. Short arm brace. Discussed splint options with patient.    Assessment     Tiffany Tamez is a 23 y.o. male referred to outpatient occupational therapy with a medical diagnosis of R wrist tendonitis resulting in Decreased functional hand use, " Increased pain and Edema and demonstrates limitations as described in the chart below. Following medical record review it is determined that pt will benefit from occupational therapy services in order to maximize pain free and/or functional use of right UE. The following goals were discussed with the patient and patient is in agreement with them as to be addressed in the treatment plan. The patient's rehab potential is Excellent.     Anticipated barriers to occupational therapy:   Pt has no cultural, educational or language barriers to learning provided.    Profile and History Assessment of Occupational Performance Level of Clinical Decision Making Complexity Score   Occupational Profile:   Tiffany Tamez is a 23 y.o. 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 Tiffany Tamez has difficulty with  ADLs and IADLs as listed previously, which  affecting his/her daily functional abilities.      Comorbidities:    has a past medical history of Allergy, Angio-edema, Asthma, Headache(784.0), and Migraine.    Medical and Therapy History Review:   Brief Performance Deficits    Physical:  Edema  Pain    Cognitive:  No Deficits    Psychosocial:    No Deficits     Clinical Decision Making:  moderate    Assessment Process:  Problem-Focused Assessments    Modification/Need for Assistance:  Not Necessary    Intervention Selection:  Limited Treatment Options       low  Based on PMHX, co morbidities , data from assessments and functional level of assistance required with task and clinical presentation directly impacting function.       The following goals were discussed with the patient and patient is in agreement with them as to be addressed in the treatment plan.     Goals:   Short Term Goals: (in 1 weeks)  1) Patient will be independent in HEP and splint use  (to be completed within 3 weeks)  2) Decrease pain with wrist extension to no more than 4/10 to improve functional mobility  Long Term Goals:  (to be completed by discharge)  3) Pt will write on chalkboard at school for at least 5 minutes without report increased pain  4) Return to prior level of functioning with IADL and work activities.    Plan   Certification Period/Plan of care expiration: 4/11/2019 to 5/24/19.    Outpatient Occupational Therapy 2 times weekly for 6 weeks to include the following interventions: Fluidotherapy, Manual therapy/joint mobilizations, Modalities for pain management, US 3 mhz, Therapeutic exercises/activities., Iontophoresis with 2.0 cc Dexamethasone, Strengthening, Edema Control, Electrical Modalities and Ergonomics.      Hafsa Tapia, OT

## 2019-04-12 PROBLEM — M25.531 PAIN IN RIGHT WRIST: Status: ACTIVE | Noted: 2019-04-12

## 2019-04-12 PROBLEM — M25.431: Status: ACTIVE | Noted: 2019-04-12

## 2019-04-15 ENCOUNTER — CLINICAL SUPPORT (OUTPATIENT)
Dept: REHABILITATION | Facility: HOSPITAL | Age: 24
End: 2019-04-15
Attending: ORTHOPAEDIC SURGERY
Payer: COMMERCIAL

## 2019-04-15 DIAGNOSIS — M25.531 PAIN IN RIGHT WRIST: ICD-10-CM

## 2019-04-15 DIAGNOSIS — M25.431 EFFUSION, RIGHT WRIST: ICD-10-CM

## 2019-04-15 PROCEDURE — 97140 MANUAL THERAPY 1/> REGIONS: CPT | Mod: PO

## 2019-04-15 PROCEDURE — 97035 APP MDLTY 1+ULTRASOUND EA 15: CPT | Mod: PO

## 2019-04-15 NOTE — PROGRESS NOTES
"  Occupational Therapy Daily Treatment Note     Date: 4/15/2019  Name: Tiffany Tamez  Clinic Number: [id number]    Therapy Diagnosis:   Encounter Diagnoses   Name Primary?    Pain in right wrist     Effusion, right wrist      Physician: Ramonita Willard, *    Physician Orders: Eval and treat, ROM, HEP, modalities ok prn, pain control, edema management   2 x weekly for 6 weeks  Medical Diagnosis: M77.8 (ICD-10-CM) - Wrist tendonitis  Surgical Procedure and Date: N/a  Evaluation Date: 4/11/2019  Insurance Authorization Period Expiration: 12/31/19  Plan of Care Certification Period: 5/24/19  Date of Return to MD: not scheduled     Visit # / Visits authorized: 2/ 20   Time In:5:30PM  Time Out: 6:15PM  Total Billable Time: 45 minutes     Precautions:  Standard. Allergic to NSAIDs per pt    Subjective     Patient presents with online thumb spica brace option. He wants to make sure it is appropriate for his condition.     Functional Pain Scale Rating 0-10:   8/10 at worst  3/10 at rest:  Location: R dorsal wrist/forearm  Description: "lightning"    Objective     Tiffany received the following supervised modalities after being cleared for contradictions for 15 minutes:  -Patient received MH to increase blood flow, circulation and tissue elasticity prior to therex    Tiffany received the following direct contact modalities after being cleared for contraindications for 8 minutes:  -Pulsed wave ultrasound 20% 3.3MHz 1.0 w/cm^2 to dorsal radial forearm at EPB/APL insertion site to decrease inflammation    Tiffany received the following manual therapy techniques for 15 minutes:   -STM to to insertion of EPB and APL  -Passive tendon stretching- EPB, APL, ECRB, ECRL    Pt fitted with size D tubigrip compression sleeve    Home Exercises and Education Provided     Education provided: OT ok'd online thumb spica splint. Instructed him to wear it during provocative activities.    Written Home Exercises Provided: Yes  Exercises were " reviewed and Tiffany was able to demonstrate them prior to the end of the session.  Tiffany demonstrated good  understanding of the HEP provided.   .   See EMR under Patient Instructions for exercises provided during initial evaluation.     Assessment     Pt with obvious swelling of dorsal radial forearm near EPB/APL insertion site. Discussed avoiding provacative activities with repetitive or sustained wrist and thumb extension. Initiated pulsed wave ultrasound to calm inlflammation and STM to promote increased blood circulation and tissue healing.    Tiffany is progressing well towards his goals and there are no updates to goals at this time. Pt prognosis is Excellent.     Pt will continue to benefit from skilled outpatient occupational therapy to address the deficits listed in the problem list on initial evaluation provide pt/family education and to maximize pt's level of independence in the home and community environment.     Anticipated barriers to occupational therapy:     Pt's spiritual, cultural and educational needs considered and pt agreeable to plan of care and goals.    Goals:  Short Term Goals: (in 1 weeks)  1) Patient will be independent in HEP and splint use-Progressing  (to be completed within 3 weeks)  2) Decrease pain with wrist extension to no more than 4/10 to improve functional mobility-Not met  Long Term Goals: (to be completed by discharge)  3) Pt will write on chalkboard at school for at least 5 minutes without report increased pain-Not met  4) Return to prior level of functioning with IADL and work activities.-Not met         Plan   Continue Outpatient Occupational Therapy 2 times weekly for 6 weeks    Updates/Grading for next session: Ensure appropriate TSS wear      Hafsa Tapia OT

## 2019-04-18 ENCOUNTER — CLINICAL SUPPORT (OUTPATIENT)
Dept: REHABILITATION | Facility: HOSPITAL | Age: 24
End: 2019-04-18
Attending: ORTHOPAEDIC SURGERY
Payer: COMMERCIAL

## 2019-04-18 DIAGNOSIS — M25.431 EFFUSION, RIGHT WRIST: ICD-10-CM

## 2019-04-18 DIAGNOSIS — M25.531 PAIN IN RIGHT WRIST: ICD-10-CM

## 2019-04-18 PROCEDURE — 97035 APP MDLTY 1+ULTRASOUND EA 15: CPT | Mod: PO

## 2019-04-18 PROCEDURE — 97110 THERAPEUTIC EXERCISES: CPT | Mod: PO

## 2019-04-18 PROCEDURE — 97140 MANUAL THERAPY 1/> REGIONS: CPT | Mod: PO

## 2019-04-18 NOTE — PROGRESS NOTES
"  Occupational Therapy Daily Treatment Note     Date: 4/18/2019  Name: Tiffany Tamez  Clinic Number: [id number]    Therapy Diagnosis:   Encounter Diagnoses   Name Primary?    Pain in right wrist     Effusion, right wrist      Physician: Ramonita Willard, *    Physician Orders: Eval and treat, ROM, HEP, modalities ok prn, pain control, edema management   2 x weekly for 6 weeks  Medical Diagnosis: M77.8 (ICD-10-CM) - Wrist tendonitis  Surgical Procedure and Date: N/a  Evaluation Date: 4/11/2019  Insurance Authorization Period Expiration: 12/31/19  Plan of Care Certification Period: 5/24/19  Date of Return to MD: not scheduled     Visit # / Visits authorized: 3/ 20   Time In:9:35AM  Time Out: 10:30AM  Total Billable Time: 38 minutes     Precautions:  Standard. Allergic to NSAIDs per pt    Subjective     Pt reports TSS came in but they sent the wrong side of the body. The other side will come in in two days.    Functional Pain Scale Rating 0-10:   8/10 at worst  3/10 at rest:  Location: R dorsal wrist/forearm  Description: "lightning"    Objective     Tiffany received the following supervised modalities after being cleared for contradictions for 15 minutes:  -Patient received MH to increase blood flow, circulation and tissue elasticity prior to therex    Tiffany received the following direct contact modalities after being cleared for contraindications for 8 minutes:  -Pulsed wave ultrasound 20% 3.3MHz 1.0 w/cm^2 to dorsal radial forearm at EPB/APL insertion site to decrease inflammation    Tiffany received the following manual therapy techniques for 15 minutes:   -IASTM to radial aspect of dorsal forearm x 10'  -Passive tendon stretching- EPB, APL, ECRB, ECRL    -Tiffany received the following therapeutic exercises for 15 minutues:  -Thumb radial abd/add with palm down x 10 reps  -Wrist flex/ext with thumb in neutral x 10 reps  -Active posterior compartment stretches    Pt fitted with size D tubigrip compression " sleeve    Home Exercises and Education Provided     Education provided: OT ok'd online thumb spica splint. Instructed him to wear it during provocative activities.    Written Home Exercises Provided: Yes  Exercises were reviewed and Tiffany was able to demonstrate them prior to the end of the session.  Tiffany demonstrated good  understanding of the HEP provided.   .   See EMR under Patient Instructions for exercises provided during initial evaluation.     Assessment     Pt to initiate use of TSS once it arrives in mail. He is compliant with HEP.    Tiffany is progressing well towards his goals and there are no updates to goals at this time. Pt prognosis is Excellent.     Pt will continue to benefit from skilled outpatient occupational therapy to address the deficits listed in the problem list on initial evaluation provide pt/family education and to maximize pt's level of independence in the home and community environment.     Anticipated barriers to occupational therapy:     Pt's spiritual, cultural and educational needs considered and pt agreeable to plan of care and goals.    Goals:  Short Term Goals: (in 1 weeks)  1) Patient will be independent in HEP and splint use-Progressing  (to be completed within 3 weeks)  2) Decrease pain with wrist extension to no more than 4/10 to improve functional mobility-Not met  Long Term Goals: (to be completed by discharge)  3) Pt will write on chalkboard at school for at least 5 minutes without report increased pain-Not met  4) Return to prior level of functioning with IADL and work activities.-Not met         Plan   Continue Outpatient Occupational Therapy 2 times weekly for 6 weeks    Updates/Grading for next session: Ensure appropriate TSS wear      Hafsa Tapia OT

## 2019-04-22 ENCOUNTER — CLINICAL SUPPORT (OUTPATIENT)
Dept: REHABILITATION | Facility: HOSPITAL | Age: 24
End: 2019-04-22
Attending: ORTHOPAEDIC SURGERY
Payer: COMMERCIAL

## 2019-04-22 DIAGNOSIS — M25.531 PAIN IN RIGHT WRIST: ICD-10-CM

## 2019-04-22 DIAGNOSIS — M25.431 EFFUSION, RIGHT WRIST: ICD-10-CM

## 2019-04-22 PROCEDURE — 97140 MANUAL THERAPY 1/> REGIONS: CPT | Mod: PO

## 2019-04-22 PROCEDURE — 97035 APP MDLTY 1+ULTRASOUND EA 15: CPT | Mod: PO

## 2019-04-22 PROCEDURE — 97110 THERAPEUTIC EXERCISES: CPT | Mod: PO

## 2019-04-22 NOTE — PROGRESS NOTES
"  Occupational Therapy Daily Treatment Note     Date: 4/22/2019  Name: Tiffany Tamez  Clinic Number: [id number]    Therapy Diagnosis:   Encounter Diagnoses   Name Primary?    Pain in right wrist     Effusion, right wrist      Physician: Ramonita Willard, *    Physician Orders: Eval and treat, ROM, HEP, modalities ok prn, pain control, edema management   2 x weekly for 6 weeks  Medical Diagnosis: M77.8 (ICD-10-CM) - Wrist tendonitis  Surgical Procedure and Date: N/a  Evaluation Date: 4/11/2019  Insurance Authorization Period Expiration: 12/31/19  Plan of Care Certification Period: 5/24/19  Date of Return to MD: not scheduled     Visit # / Visits authorized: 4/ 20   Time In:10:35AM  Time Out: 11:15AM  Total Billable Time: 40 minutes     Precautions:  Standard. Allergic to NSAIDs per pt    Subjective     Pt reports TSS came in but they sent the wrong side of the body. The other side will come in in two days.    Functional Pain Scale Rating 0-10:   8/10 at worst  3/10 at rest:  Location: R dorsal wrist/forearm  Description: "lightning"    Objective     Tiffany received the following supervised modalities after being cleared for contradictions for 15 minutes:  -Patient received MH to increase blood flow, circulation and tissue elasticity prior to therex    Tiffany received the following direct contact modalities after being cleared for contraindications for 8 minutes:  -Pulsed wave ultrasound 20% 3.3MHz 1.0 w/cm^2 to dorsal radial forearm at EPB/APL insertion site to decrease inflammation    Tiffany received the following manual therapy techniques for 15 minutes:   -IASTM to radial aspect of dorsal forearm x 10'  -Passive tendon stretching- EPB, APL, ECRB, ECRL    -Tiffany received the following therapeutic exercises for 15 minutues:  -Thumb radial abd/add with palm down x 10 reps  -Wrist flex/ext with thumb in neutral x 10 reps  -Active posterior compartment stretches    Pt fitted with size D tubigrip compression " sleeve    Home Exercises and Education Provided     Education provided: OT ok'd online thumb spica splint. Instructed him to wear it during provocative activities.    Written Home Exercises Provided: Yes  Exercises were reviewed and Tiffany was able to demonstrate them prior to the end of the session.  Tiffany demonstrated good  understanding of the HEP provided.   .   See EMR under Patient Instructions for exercises provided during initial evaluation.     Assessment     Pt with decreased c/o pain following IASTM treatment.    Tiffany is progressing well towards his goals and there are no updates to goals at this time. Pt prognosis is Excellent.     Pt will continue to benefit from skilled outpatient occupational therapy to address the deficits listed in the problem list on initial evaluation provide pt/family education and to maximize pt's level of independence in the home and community environment.     Anticipated barriers to occupational therapy:     Pt's spiritual, cultural and educational needs considered and pt agreeable to plan of care and goals.    Goals:  Short Term Goals: (in 1 weeks)  1) Patient will be independent in HEP and splint use-Progressing  (to be completed within 3 weeks)  2) Decrease pain with wrist extension to no more than 4/10 to improve functional mobility-Not met  Long Term Goals: (to be completed by discharge)  3) Pt will write on chalkboard at school for at least 5 minutes without report increased pain-Not met  4) Return to prior level of functioning with IADL and work activities.-Not met         Plan   Continue Outpatient Occupational Therapy 2 times weekly for 6 weeks    Updates/Grading for next session: Ensure appropriate TSS wear      Hafsa Tapia OT

## 2019-04-25 ENCOUNTER — CLINICAL SUPPORT (OUTPATIENT)
Dept: REHABILITATION | Facility: HOSPITAL | Age: 24
End: 2019-04-25
Attending: ORTHOPAEDIC SURGERY
Payer: COMMERCIAL

## 2019-04-25 DIAGNOSIS — M25.531 PAIN IN RIGHT WRIST: ICD-10-CM

## 2019-04-25 DIAGNOSIS — M25.431 EFFUSION, RIGHT WRIST: ICD-10-CM

## 2019-04-25 PROCEDURE — 97035 APP MDLTY 1+ULTRASOUND EA 15: CPT | Mod: PO

## 2019-04-25 PROCEDURE — 97140 MANUAL THERAPY 1/> REGIONS: CPT | Mod: PO

## 2019-04-25 NOTE — PROGRESS NOTES
"  Occupational Therapy Daily Treatment Note     Date: 4/25/2019  Name: Tiffany Tamez  Clinic Number: [id number]    Therapy Diagnosis:   Encounter Diagnoses   Name Primary?    Pain in right wrist     Effusion, right wrist      Physician: Ramonita Willard, *    Physician Orders: Eval and treat, ROM, HEP, modalities ok prn, pain control, edema management   2 x weekly for 6 weeks  Medical Diagnosis: M77.8 (ICD-10-CM) - Wrist tendonitis  Surgical Procedure and Date: N/a  Evaluation Date: 4/11/2019  Insurance Authorization Period Expiration: 12/31/19  Plan of Care Certification Period: 5/24/19  Date of Return to MD: not scheduled     Visit # / Visits authorized: 5/ 20   Time In:9:30AM  Time Out: 10:10AM  Total Billable Time: 40 minutes     Precautions:  Standard. Allergic to NSAIDs per pt    Subjective     Pt presents with appropriate brace donned. He reports he is able to write on the chalkboard with it at work comfortably.    Functional Pain Scale Rating 0-10:   8/10 at worst  3/10 at rest:  Location: R dorsal wrist/forearm  Description: "lightning"    Objective     Tiffany received the following supervised modalities after being cleared for contradictions for 15 minutes:  -Patient received MH to increase blood flow, circulation and tissue elasticity prior to therex    Tiffany received the following direct contact modalities after being cleared for contraindications for 8 minutes:  -Pulsed wave ultrasound 20% 3.3MHz 1.0 w/cm^2 to dorsal radial forearm at EPB/APL insertion site to decrease inflammation    Tiffany received the following manual therapy techniques for 15 minutes:   -IASTM to radial aspect of dorsal forearm x 10'  -Passive tendon stretching- EPB, APL, ECRB, ECRL    -Tiffany received the following therapeutic exercises for 5 minutues:  -Thumb radial abd/add with palm down x 10 reps  -Wrist flex/ext with thumb in neutral x 10 reps  -Active posterior compartment stretches    Pt fitted with size D tubigrip " compression sleeve    Home Exercises and Education Provided     Education provided: Continue HEP and splint use    Written Home Exercises Provided: Yes  Exercises were reviewed and Tiffany was able to demonstrate them prior to the end of the session.  Tiffany demonstrated good  understanding of the HEP provided.   .   See EMR under Patient Instructions for exercises provided during initial evaluation.     Assessment     Significant soft tissue adhesion along radial extensors noted during IASTM treatment. Pt responds well to treatment with decreased pain post therapy.    Tiffany is progressing well towards his goals and there are no updates to goals at this time. Pt prognosis is Excellent.     Pt will continue to benefit from skilled outpatient occupational therapy to address the deficits listed in the problem list on initial evaluation provide pt/family education and to maximize pt's level of independence in the home and community environment.     Anticipated barriers to occupational therapy:     Pt's spiritual, cultural and educational needs considered and pt agreeable to plan of care and goals.    Goals:  Short Term Goals: (in 1 weeks)  1) Patient will be independent in HEP and splint use-Progressing  (to be completed within 3 weeks)  2) Decrease pain with wrist extension to no more than 4/10 to improve functional mobility-Not met  Long Term Goals: (to be completed by discharge)  3) Pt will write on chalkboard at school for at least 5 minutes without report increased pain-Not met  4) Return to prior level of functioning with IADL and work activities.-Not met     Plan   Continue Outpatient Occupational Therapy 2 times weekly for 6 weeks    Updates/Grading for next session: Ensure appropriate TSS wear      Hafsa Tapia OT

## 2019-04-29 ENCOUNTER — CLINICAL SUPPORT (OUTPATIENT)
Dept: REHABILITATION | Facility: HOSPITAL | Age: 24
End: 2019-04-29
Attending: ORTHOPAEDIC SURGERY
Payer: COMMERCIAL

## 2019-04-29 DIAGNOSIS — M25.531 PAIN IN RIGHT WRIST: ICD-10-CM

## 2019-04-29 DIAGNOSIS — M25.431 EFFUSION, RIGHT WRIST: ICD-10-CM

## 2019-04-29 PROCEDURE — 97110 THERAPEUTIC EXERCISES: CPT | Mod: PO

## 2019-04-29 PROCEDURE — 97035 APP MDLTY 1+ULTRASOUND EA 15: CPT | Mod: PO

## 2019-04-29 PROCEDURE — 97140 MANUAL THERAPY 1/> REGIONS: CPT | Mod: PO

## 2019-04-29 NOTE — PROGRESS NOTES
"  Occupational Therapy Daily Treatment Note     Date: 4/29/2019  Name: Tiffany Tamez  Clinic Number: [id number]    Therapy Diagnosis:   Encounter Diagnoses   Name Primary?    Pain in right wrist     Effusion, right wrist      Physician: Ramonita Willard, *    Physician Orders: Eval and treat, ROM, HEP, modalities ok prn, pain control, edema management   2 x weekly for 6 weeks  Medical Diagnosis: M77.8 (ICD-10-CM) - Wrist tendonitis  Surgical Procedure and Date: N/a  Evaluation Date: 4/11/2019  Insurance Authorization Period Expiration: 12/31/19  Plan of Care Certification Period: 5/24/19  Date of Return to MD: not scheduled     Visit # / Visits authorized: 6/ 20   Time In:4:30PM  Time Out: 5:10PM  Total Billable Time: 40 minutes     Precautions:  Standard. Allergic to NSAIDs per pt    Subjective     Pt reports increased soreness today but cannot remember doing anything to aggravate it.    Functional Pain Scale Rating 0-10:   8/10 at worst  3/10 at rest:  Location: R dorsal wrist/forearm  Description: "lightning"    Objective     Tiffany received the following supervised modalities after being cleared for contradictions for 15 minutes:  -Patient received MH to increase blood flow, circulation and tissue elasticity prior to therex    Tiffany received the following direct contact modalities after being cleared for contraindications for 8 minutes:  -Pulsed wave ultrasound 20% 3.3MHz 1.0 w/cm^2 to dorsal radial forearm at EPB/APL insertion site to decrease inflammation    Tiffany received the following manual therapy techniques for 15 minutes:   -IASTM to radial aspect of dorsal forearm x 10'  -Passive tendon stretching- EPB, APL, ECRB, ECRL    -Tiffany received the following therapeutic exercises for 5 minutues:  -Thumb radial abd/add with palm down x 10 reps  -Wrist flex/ext with thumb in neutral x 10 reps  -Active posterior compartment stretches  -KT applied to radial with inhibitory stretch distal to proximal from thumb " IP to proximal APL/EPB insertion   Pt fitted with size D tubigrip compression sleeve    Home Exercises and Education Provided     Education provided: Continue HEP and splint use    Written Home Exercises Provided: Yes  Exercises were reviewed and Tiffany was able to demonstrate them prior to the end of the session.  Tiffany demonstrated good  understanding of the HEP provided.   .   See EMR under Patient Instructions for exercises provided during initial evaluation.     Assessment     Significant soft tissue adhesion along radial extensors noted during IASTM treatment. Pt responds well to treatment with decreased pain post therapy. Applied KT to decrease stress to thumb extensors.    Tiffany is progressing well towards his goals and there are no updates to goals at this time. Pt prognosis is Excellent.     Pt will continue to benefit from skilled outpatient occupational therapy to address the deficits listed in the problem list on initial evaluation provide pt/family education and to maximize pt's level of independence in the home and community environment.     Anticipated barriers to occupational therapy:     Pt's spiritual, cultural and educational needs considered and pt agreeable to plan of care and goals.    Goals:  Short Term Goals: (in 1 weeks)  1) Patient will be independent in HEP and splint use-Progressing  (to be completed within 3 weeks)  2) Decrease pain with wrist extension to no more than 4/10 to improve functional mobility-Not met  Long Term Goals: (to be completed by discharge)  3) Pt will write on chalkboard at school for at least 5 minutes without report increased pain-Not met  4) Return to prior level of functioning with IADL and work activities.-Not met     Plan   Continue Outpatient Occupational Therapy 2 times weekly for 6 weeks    Updates/Grading for next session: Ensure appropriate TSS wear      Hafsa Tapia, OT

## 2019-05-06 ENCOUNTER — CLINICAL SUPPORT (OUTPATIENT)
Dept: REHABILITATION | Facility: HOSPITAL | Age: 24
End: 2019-05-06
Attending: ORTHOPAEDIC SURGERY
Payer: COMMERCIAL

## 2019-05-06 DIAGNOSIS — M25.431 EFFUSION, RIGHT WRIST: ICD-10-CM

## 2019-05-06 DIAGNOSIS — M25.531 PAIN IN RIGHT WRIST: ICD-10-CM

## 2019-05-06 PROCEDURE — 97110 THERAPEUTIC EXERCISES: CPT | Mod: PO

## 2019-05-06 PROCEDURE — 97140 MANUAL THERAPY 1/> REGIONS: CPT | Mod: PO

## 2019-05-06 NOTE — PROGRESS NOTES
"  Occupational Therapy Daily Treatment Note     Date: 5/6/2019  Name: Tiffany Tamez  Clinic Number: [id number]    Therapy Diagnosis:   Encounter Diagnoses   Name Primary?    Pain in right wrist     Effusion, right wrist      Physician: Ramonita Willard, *    Physician Orders: Eval and treat, ROM, HEP, modalities ok prn, pain control, edema management   2 x weekly for 6 weeks  Medical Diagnosis: M77.8 (ICD-10-CM) - Wrist tendonitis  Surgical Procedure and Date: N/a  Evaluation Date: 4/11/2019  Insurance Authorization Period Expiration: 12/31/19  Plan of Care Certification Period: 5/24/19  Date of Return to MD: not scheduled     Visit # / Visits authorized: 7/ 20   Time In:2:10PM:00  Time Out: 3:00PM  Total Billable Time: 50 minutes     Precautions:  Standard. Allergic to NSAIDs per pt    Subjective     "It feels a little better since the weekend but it seems like it is still swollen."    Functional Pain Scale Rating 0-10:   8/10 at worst  3/10 at rest:  Location: R dorsal wrist/forearm  Description: "lightning"    Objective     Tiffany received the following supervised modalities after being cleared for contradictions for 15 minutes:  -Patient received MH to increase blood flow, circulation and tissue elasticity prior to therex    Tiffany received the following direct contact modalities after being cleared for contraindications for 5 minutes:   -Tiffany  received an extended wear Iontophoresis patch for pain control and decreased inflammation with 1.5cc Dexamethasone applied to intersection of 1st and 2nd dorsal compartments. Patient educated on wear time and precautions voicing  understanding and compliance.    Tiffany received the following manual therapy techniques for 15 minutes:   -IASTM to radial aspect of dorsal forearm x 10'  -Passive tendon stretching- EPB, APL, ECRB, ECRL    -Tiffany received the following therapeutic exercises for 10 minutues:  -Thumb radial abd/add with palm down x 10 reps  -Wrist flex/ext with " thumb in neutral x 10 reps  -Active posterior compartment stretches  -KT applied to radial with inhibitory stretch distal to proximal from thumb IP to proximal APL/EPB insertion (NT)  -Pt educated on ionto purpose and usage  Pt fitted with size D tubigrip compression sleeve    Home Exercises and Education Provided     Education provided: Continue HEP and splint use    Written Home Exercises Provided: Yes  Exercises were reviewed and Tiffany was able to demonstrate them prior to the end of the session.  Tiffany demonstrated good  understanding of the HEP provided.   .   See EMR under Patient Instructions for exercises provided during initial evaluation.     Assessment     Minimal change in edema of dorsal-radial wrist. Initiated iontophoresis with dexamethisone today to reduce inflammation. Applied extended wear pad advising pt to wear for short period of time to assess skin reaction. Will continue if well tolerated.    Tiffany is progressing well towards his goals and there are no updates to goals at this time. Pt prognosis is Excellent.     Pt will continue to benefit from skilled outpatient occupational therapy to address the deficits listed in the problem list on initial evaluation provide pt/family education and to maximize pt's level of independence in the home and community environment.     Anticipated barriers to occupational therapy:     Pt's spiritual, cultural and educational needs considered and pt agreeable to plan of care and goals.    Goals:  Short Term Goals: (in 1 weeks)  1) Patient will be independent in HEP and splint use-Progressing  (to be completed within 3 weeks)  2) Decrease pain with wrist extension to no more than 4/10 to improve functional mobility-Not met  Long Term Goals: (to be completed by discharge)  3) Pt will write on chalkboard at school for at least 5 minutes without report increased pain-Not met  4) Return to prior level of functioning with IADL and work activities.-Not met     Plan    Continue Outpatient Occupational Therapy 2 times weekly for 6 weeks    Updates/Grading for next session: Ensure appropriate TSS wear      Hafsa Tapia OT

## 2019-05-13 ENCOUNTER — CLINICAL SUPPORT (OUTPATIENT)
Dept: REHABILITATION | Facility: HOSPITAL | Age: 24
End: 2019-05-13
Attending: ORTHOPAEDIC SURGERY
Payer: COMMERCIAL

## 2019-05-13 DIAGNOSIS — M25.431 EFFUSION, RIGHT WRIST: ICD-10-CM

## 2019-05-13 DIAGNOSIS — M25.531 PAIN IN RIGHT WRIST: ICD-10-CM

## 2019-05-13 PROCEDURE — 97033 APP MDLTY 1+IONTPHRSIS EA 15: CPT | Mod: PO

## 2019-05-13 PROCEDURE — 97140 MANUAL THERAPY 1/> REGIONS: CPT | Mod: PO

## 2019-05-13 NOTE — PROGRESS NOTES
"  Occupational Therapy Daily Treatment Note     Date: 5/13/2019  Name: Tiffany Tamez  Clinic Number: [id number]    Therapy Diagnosis:   Encounter Diagnoses   Name Primary?    Pain in right wrist     Effusion, right wrist      Physician: Ramonita Willard, *    Physician Orders: Eval and treat, ROM, HEP, modalities ok prn, pain control, edema management   2 x weekly for 6 weeks  Medical Diagnosis: M77.8 (ICD-10-CM) - Wrist tendonitis  Surgical Procedure and Date: N/a  Evaluation Date: 4/11/2019  Insurance Authorization Period Expiration: 12/31/19  Plan of Care Certification Period: 5/24/19  Date of Return to MD: not scheduled     Visit # / Visits authorized: 8/ 20   Time In: 4:35PM  Time Out: 4:20PM  Total Billable Time: 28 minutes     Precautions:  Standard. Allergic to NSAIDs per pt    Subjective     "I did not have a bad reaction to the ionto pad. When I took the pad off my skin was not red. My wrist actually felt a little better for a few hours."  Functional Pain Scale Rating 0-10:   8/10 at worst  3/10 at rest:  Location: R dorsal wrist/forearm  Description: "lightning"    Objective     Tiffany received the following supervised modalities after being cleared for contradictions for 15 minutes:  -Patient received MH to increase blood flow, circulation and tissue elasticity prior to therex    Tiffany received the following direct contact modalities after being cleared for contraindications for 8 minutes:   -Tiffany  received an extended wear Iontophoresis patch for pain control and decreased inflammation with 2.0cc Dexamethasone to - charged side and saline to + charged side applied to intersection of 1st and 2nd dorsal compartments(about 1 cm proximal to Alicia's tubercle). Patient educated on wear time and precautions voicing  understanding and compliance.    Tiffany received the following manual therapy techniques for 15 minutes:   -IASTM to radial aspect of dorsal forearm x 10'  -Passive tendon stretching- EPB, APL, " ECRB, ECRL    -Tiffany received the following therapeutic exercises for 5 minutues:  -Thumb radial abd/add with palm down x 10 reps  -Wrist flex/ext with thumb in neutral x 10 reps  -Active posterior compartment stretches      Home Exercises and Education Provided     Education provided: Continue HEP and splint use. Extended iontophoretic pad use and wear time- 24 hours.    Written Home Exercises Provided: Yes  Exercises were reviewed and Tiffany was able to demonstrate them prior to the end of the session.  Tiffany demonstrated good  understanding of the HEP provided.   .   See EMR under Patient Instructions for exercises provided during initial evaluation.     Assessment     Pt tolerated iontophoresis well. Mild improvement in symptoms reported.    Tiffany is progressing well towards his goals and there are no updates to goals at this time. Pt prognosis is Excellent.     Pt will continue to benefit from skilled outpatient occupational therapy to address the deficits listed in the problem list on initial evaluation provide pt/family education and to maximize pt's level of independence in the home and community environment.     Anticipated barriers to occupational therapy:     Pt's spiritual, cultural and educational needs considered and pt agreeable to plan of care and goals.    Goals:  Short Term Goals: (in 1 weeks)  1) Patient will be independent in HEP and splint use-Progressing  (to be completed within 3 weeks)  2) Decrease pain with wrist extension to no more than 4/10 to improve functional mobility-Not met  Long Term Goals: (to be completed by discharge)  3) Pt will write on chalkboard at school for at least 5 minutes without report increased pain-Not met  4) Return to prior level of functioning with IADL and work activities.-Not met     Plan   Continue Outpatient Occupational Therapy 2 times weekly for 6 weeks    Updates/Grading for next session: Ensure appropriate TSS wear      Hafsa Tapia OT

## 2019-05-20 ENCOUNTER — CLINICAL SUPPORT (OUTPATIENT)
Dept: REHABILITATION | Facility: HOSPITAL | Age: 24
End: 2019-05-20
Attending: ORTHOPAEDIC SURGERY
Payer: COMMERCIAL

## 2019-05-20 DIAGNOSIS — M25.531 PAIN IN RIGHT WRIST: ICD-10-CM

## 2019-05-20 DIAGNOSIS — M25.431 EFFUSION, RIGHT WRIST: ICD-10-CM

## 2019-05-20 PROCEDURE — 97140 MANUAL THERAPY 1/> REGIONS: CPT | Mod: PO

## 2019-05-20 PROCEDURE — 97033 APP MDLTY 1+IONTPHRSIS EA 15: CPT | Mod: PO

## 2019-11-12 ENCOUNTER — TELEPHONE (OUTPATIENT)
Dept: INTERNAL MEDICINE | Facility: CLINIC | Age: 24
End: 2019-11-12

## 2019-11-12 DIAGNOSIS — Z00.00 ANNUAL PHYSICAL EXAM: Primary | ICD-10-CM

## 2020-10-05 ENCOUNTER — PATIENT MESSAGE (OUTPATIENT)
Dept: ADMINISTRATIVE | Facility: HOSPITAL | Age: 25
End: 2020-10-05

## 2021-01-04 ENCOUNTER — PATIENT MESSAGE (OUTPATIENT)
Dept: ADMINISTRATIVE | Facility: HOSPITAL | Age: 26
End: 2021-01-04

## 2021-04-05 ENCOUNTER — PATIENT MESSAGE (OUTPATIENT)
Dept: ADMINISTRATIVE | Facility: HOSPITAL | Age: 26
End: 2021-04-05

## 2021-04-26 ENCOUNTER — PATIENT MESSAGE (OUTPATIENT)
Dept: RESEARCH | Facility: HOSPITAL | Age: 26
End: 2021-04-26

## 2021-07-06 ENCOUNTER — PATIENT MESSAGE (OUTPATIENT)
Dept: ADMINISTRATIVE | Facility: HOSPITAL | Age: 26
End: 2021-07-06

## 2021-10-04 ENCOUNTER — PATIENT MESSAGE (OUTPATIENT)
Dept: ADMINISTRATIVE | Facility: HOSPITAL | Age: 26
End: 2021-10-04

## 2021-10-16 ENCOUNTER — CLINICAL SUPPORT (OUTPATIENT)
Dept: URGENT CARE | Facility: CLINIC | Age: 26
End: 2021-10-16

## 2021-10-16 DIAGNOSIS — Z11.52 ENCOUNTER FOR SCREENING LABORATORY TESTING FOR COVID-19 VIRUS IN ASYMPTOMATIC PATIENT: Primary | ICD-10-CM

## 2021-10-16 DIAGNOSIS — Z01.812 ENCOUNTER FOR SCREENING LABORATORY TESTING FOR COVID-19 VIRUS IN ASYMPTOMATIC PATIENT: Primary | ICD-10-CM

## 2021-10-16 LAB
CTP QC/QA: YES
SARS-COV-2 RDRP RESP QL NAA+PROBE: NEGATIVE

## 2021-10-16 PROCEDURE — U0002: ICD-10-PCS | Mod: QW,S$GLB,, | Performed by: FAMILY MEDICINE

## 2021-10-16 PROCEDURE — U0002 COVID-19 LAB TEST NON-CDC: HCPCS | Mod: QW,S$GLB,, | Performed by: FAMILY MEDICINE

## 2022-01-25 ENCOUNTER — PATIENT MESSAGE (OUTPATIENT)
Dept: ADMINISTRATIVE | Facility: HOSPITAL | Age: 27
End: 2022-01-25
Payer: COMMERCIAL

## 2025-03-18 NOTE — PROGRESS NOTES
"  Occupational Therapy Daily Treatment Note     Date: 5/20/2019  Name: Tiffany Tamez  Clinic Number: [id number]    Therapy Diagnosis:   Encounter Diagnoses   Name Primary?    Pain in right wrist     Effusion, right wrist      Physician: Ramonita Willard, *    Physician Orders: Eval and treat, ROM, HEP, modalities ok prn, pain control, edema management   2 x weekly for 6 weeks  Medical Diagnosis: M77.8 (ICD-10-CM) - Wrist tendonitis  Surgical Procedure and Date: N/a  Evaluation Date: 4/11/2019  Insurance Authorization Period Expiration: 12/31/19  Plan of Care Certification Period: 5/24/19  Date of Return to MD: Patient to schedule within the month     Visit # / Visits authorized: 9/ 20   Time In: 5:05PM  Time Out: 5:40PM  Total Billable Time: 25 minutes     Precautions:  Standard. Allergic to NSAIDs per pt    Subjective     "My wrist feels the best 4 hours after application of ionto pad. It slowly starts hurting again after the pad is removed for 24 hour wear time."  Functional Pain Scale Rating 0-10:   8/10 at worst  3/10 at rest:  Location: R dorsal wrist/forearm  Description: "lightning"    Objective     Tiffany received the following supervised modalities after being cleared for contradictions for 10 minutes:  -Patient received MH to increase blood flow, circulation and tissue elasticity prior to therex    Tiffany received the following direct contact modalities after being cleared for contraindications for 8 minutes:   -Tiffany  received an extended wear Iontophoresis patch for pain control and decreased inflammation with 2.0cc Dexamethasone to - charged side and saline to + charged side applied to intersection of 1st and 2nd dorsal compartments(about 1 cm proximal to Alicia's tubercle). Patient educated on wear time and precautions voicing  understanding and compliance.    Tiffany received the following manual therapy techniques for 13 minutes:   -IASTM to radial aspect of dorsal forearm x 10'  -Passive tendon " stretching- EPB, APL, ECRB, ECRL    -Tiffany received the following therapeutic exercises for 4 minutues:  -Thumb radial abd/add with palm down x 10 reps  -Wrist flex/ext with thumb in neutral x 10 reps  -Active posterior compartment stretches      Home Exercises and Education Provided     Education provided: Continue HEP and splint use. Extended iontophoretic pad use and wear time- 24 hours.    Written Home Exercises Provided: Yes  Exercises were reviewed and Tiffany was able to demonstrate them prior to the end of the session.  Tiffany demonstrated good  understanding of the HEP provided.   .   See EMR under Patient Instructions for exercises provided during initial evaluation.     Assessment     Continued edema of dorsal wrist with continued pain with resisted or sustained thumb/wrist extension. Patient responds well to iontophoresis treatment with dexamethasone but the effect does not last long enough to allow improved functional use of the RUE. Recommended patient schedule follow up with MD to discuss other options.    Tiffany is progressing well towards his goals and there are no updates to goals at this time. Pt prognosis is Excellent.     Pt will continue to benefit from skilled outpatient occupational therapy to address the deficits listed in the problem list on initial evaluation provide pt/family education and to maximize pt's level of independence in the home and community environment.     Anticipated barriers to occupational therapy:     Pt's spiritual, cultural and educational needs considered and pt agreeable to plan of care and goals.    Goals:  Short Term Goals: (in 1 weeks)  1) Patient will be independent in HEP and splint use-Met  (to be completed within 3 weeks)  2) Decrease pain with wrist extension to no more than 4/10 to improve functional mobility-Not met  Long Term Goals: (to be completed by discharge)  3) Pt will write on chalkboard at school for at least 5 minutes without report increased pain-Not met  4)  Return to prior level of functioning with IADL and work activities.-Not met     Plan   Continue Outpatient Occupational Therapy 2 times weekly for 6 weeks    Updates/Grading for next session: Anticipate discharge pending MD decision      Hafsa Tapia, OT    Discharged